# Patient Record
(demographics unavailable — no encounter records)

---

## 2017-11-19 NOTE — RAD
CHEST 1 VIEW:

 

Date:  11/19/17 

 

HISTORY:  

Chest pain. 

 

COMPARISON:  

Chest 1 view dated 03/22/17. 

 

FINDINGS:

Chronic changes in the lung bases. No pneumothorax. No focal air space consolidation. 

 

Moderate calcifications of the aorta. No acute osseous abnormalities. 

 

IMPRESSION: 

No acute intrathoracic abnormality. 

 

 

POS: [facility]

## 2017-11-20 NOTE — HP
DATE OF CONSULTATION:  11/20/2017

 

CHIEF COMPLAINT:  Cannot swallow and upper epigastric to lower chest pain.

 

HISTORY OF PRESENT ILLNESS:  Mr. Knapp is an 87-year-old man who has had intermittent episodes of d
ysphagia to solids for which he has to vomit to relieve the symptoms.  This afternoon, he was eating 
fish, the food quit going down and developed aching pain in the lower chest region.  He tried vomitin
g several times, but just vomited up some clear mucousy material.  He came to the emergency room for 
further care.  He was given glucagon without help.  He attempted to swallow water, but water just cam
e back up.  He has had no shortness of breath, no lower abdominal pain, no diarrhea, constipation, we
ight loss or blood in the stool.

 

PAST MEDICAL HISTORY:  Hypertension, COPD, stroke, urethral stricture, hyperlipidemia.

 

PAST SURGICAL HISTORY:  Cholecystectomy and EGD in 2009 that showed an esophageal ring by Dr. Novak.


 

FAMILY HISTORY:  Negative for GI malignancy.

 

SOCIAL HISTORY:  No alcohol, tobacco, or drugs.

 

ALLERGIES:  No known drug allergies.

 

MEDICATIONS:  Prior to admission, Advair, DuoNeb, amlodipine, simvastatin, aspirin, vitamin.

 

REVIEW OF SYSTEMS:  Negative x10 systems reviewed except as stated in the history of present illness.


 

PHYSICAL EXAMINATION:

GENERAL:  He is in no acute distress, alert and oriented x3.

HEENT:  Eyes have no scleral icterus.  Oropharynx is clear without lesions.

NECK:  No cervical or supraclavicular lymphadenopathy.

LUNGS:  Clear to auscultation bilaterally.

HEART:  Regular rate and rhythm.  He has a 3/6 systolic murmur at right upper sternal border.

ABDOMEN:  Soft, nontender, nondistended.  Bowel sounds are present.

EXTREMITIES:  No lower extremity edema.

 

IMPRESSION:  Esophageal food bolus impaction.  He is unable to tolerate liquids.

 

PLAN:

1.  EGD this evening.

2.  Start proton-pump inhibitor daily.

## 2017-11-20 NOTE — OP
DATE OF PROCEDURE:  11/20/2017

 

PROCEDURE:  Esophagogastroduodenoscopy with removal of esophageal foreign body and esophageal balloon
 dilation and biopsy.

 

PREOPERATIVE DIAGNOSIS:  Esophageal foreign body and food bolus impaction.

 

OPERATIVE NOTE:  Informed consent was obtained from the patient.  He was sedated with general anesthe
gracie.  The bite block was placed and the endoscope was advanced easily to the second portion of the du
odenum and retroflexion was performed in the stomach.  The esophagus had a food bolus impacted in the
 distal esophagus.  This was advanced into the stomach with gentle pressure with the endoscope.  Ther
e was a stricture in the distal esophagus, which was circumferential with some circumferential erosio
n.  The stricture was dilated to 16.5 mm, which was stage II of an 18-mm balloon.  An appropriate tea
r at the mucosa at the dilation site was seen.  There was a 1-2 cm hiatal hernia present.  The stomac
h was normal including retroflexed views otherwise.  Biopsies were taken from the antrum of the stoma
ch to rule out H. pylori.  There were multiple shallow ulcers in the first portion of the duodenum.  
The second portion of the duodenum was normal.

 

IMPRESSION:

1.  Esophageal food bolus advanced into the stomach with the endoscope.

2.  Circumferential erosive esophagitis grade C at the level of the stricture.

3.  Esophageal stricture dilated to 16.5 mm with a balloon.  An appropriate tear was seen at the dila
tion site.

4.  A 1-2 cm hiatal hernia.

5.  Multiple shallow duodenal ulcers.  Gastric biopsies were obtained to rule out Helicobacter pylori
.

 

RECOMMENDATIONS:

1.  Await histopathology.

2.  Pantoprazole 40 mg daily.

3.  Follow up with Dr. Novak in 2-4 weeks.  Consider EGD to dilate further depending on symptom resp
onse.

4.  He does have a difficult airway and a followup endoscopy is planned and consider scheduling this 
at the hospital.

## 2018-08-01 NOTE — RAD
LUMBAR SPINE 4 VIEWS:

 

Date:  08/01/18 

 

HISTORY:  

Acute right-sided low back pain. 

 

COMPARISON:  

None. 

 

FINDINGS:

There is diffuse bone demineralization. Five lumbar-type vertebral bodies. No spondylolisthesis or sp
ondylolysis. Lumbar spine vertebral body height is maintained. No fracture. Atherosclerosis of aorta 
is noted. 

 

IMPRESSION: 

Diffuse bone demineralization without evidence of a fracture or significant loss of disc space height
. 

 

 

POS: [facility]

## 2018-08-01 NOTE — RAD
ONE VIEW PELVIS:

TWO VIEWS RIGHT HIP:

 

HISTORY:

Acute low back pain.

 

COMPARISON:

None.

 

FINDINGS:

PELVIS:  There appears to be metallic suture projecting over the right hemipelvis.  The sacroiliac astrid
ints are patent and symmetric.  The bony pelvis is intact.  The visualized sacral ala are preserved. 
 Contour of the left and right femoral head are maintained in the single AP projection of the pelvis.
  Mild loss of left and right hip joint space height.

 

HIP:  The contour of the femoral head is maintained.  Mild loss of joint space height.  No fracture.

 

IMPRESSION:

1.  Unremarkable one view pelvis.

 

2.  Unremarkable two views right hip.  No fracture.

 

POS: [facility]